# Patient Record
(demographics unavailable — no encounter records)

---

## 2024-12-05 NOTE — HISTORY OF PRESENT ILLNESS
[de-identified] : vomiting and abdominal pain [FreeTextEntry6] : 8 year old female here with abdominal pain and vomiting  HPI: States she vomited several times last night after dinner Woke up at early in the morning and vomited at home Now just vomited in the classroom Ate a sandwich for breakfast with eggs, sausage  Abdominal pain now is 8/10

## 2024-12-05 NOTE — PHYSICAL EXAM
[NL] : clear to auscultation bilaterally [Soft] : soft [Tender] : tender [Distended] : nondistended [Normal Bowel Sounds] : normal bowel sounds [Rebound tenderness] : no rebound tenderness [FreeTextEntry1] : appears tired [FreeTextEntry9] : pain centrally located

## 2024-12-05 NOTE — DISCUSSION/SUMMARY
[FreeTextEntry1] : GI virus:  vomiting and abdominal pain  Plan TC to Mom Light diet today. Increase clear PO fluids and rest. Advance diet slowly as tolerated. To PMD  for worsening  symptoms.  TC to parent  Medication given